# Patient Record
Sex: FEMALE | Race: ASIAN | NOT HISPANIC OR LATINO | URBAN - METROPOLITAN AREA
[De-identification: names, ages, dates, MRNs, and addresses within clinical notes are randomized per-mention and may not be internally consistent; named-entity substitution may affect disease eponyms.]

---

## 2017-01-27 ENCOUNTER — AMBULATORY - HEALTHEAST (OUTPATIENT)
Dept: PHYSICAL MEDICINE AND REHAB | Facility: CLINIC | Age: 42
End: 2017-01-27

## 2017-01-27 ENCOUNTER — RECORDS - HEALTHEAST (OUTPATIENT)
Dept: ADMINISTRATIVE | Facility: OTHER | Age: 42
End: 2017-01-27

## 2017-02-02 ENCOUNTER — HOSPITAL ENCOUNTER (OUTPATIENT)
Dept: PHYSICAL MEDICINE AND REHAB | Facility: CLINIC | Age: 42
Discharge: HOME | End: 2017-02-02
Attending: PHYSICIAN ASSISTANT

## 2017-02-02 DIAGNOSIS — G47.9 SLEEP DISTURBANCE: ICD-10-CM

## 2017-02-02 DIAGNOSIS — M54.16 LUMBAR RADICULITIS: ICD-10-CM

## 2017-02-02 DIAGNOSIS — M43.17 SPONDYLOLISTHESIS AT L5-S1 LEVEL: ICD-10-CM

## 2017-02-02 RX ORDER — ACETAMINOPHEN 500 MG
500 TABLET ORAL 2 TIMES DAILY
Status: SHIPPED | COMMUNITY
Start: 2017-02-02

## 2017-02-02 ASSESSMENT — MIFFLIN-ST. JEOR: SCORE: 1195.4

## 2017-02-03 ENCOUNTER — RECORDS - HEALTHEAST (OUTPATIENT)
Dept: ADMINISTRATIVE | Facility: OTHER | Age: 42
End: 2017-02-03

## 2017-03-02 ENCOUNTER — OFFICE VISIT - HEALTHEAST (OUTPATIENT)
Dept: PHYSICAL THERAPY | Facility: REHABILITATION | Age: 42
End: 2017-03-02

## 2017-03-02 DIAGNOSIS — M53.86 DECREASED ROM OF LUMBAR SPINE: ICD-10-CM

## 2017-03-02 DIAGNOSIS — M54.16 CHRONIC LEFT LUMBAR RADICULOPATHY: ICD-10-CM

## 2017-03-02 DIAGNOSIS — M62.81 LEFT-SIDED MUSCLE WEAKNESS: ICD-10-CM

## 2017-03-09 ENCOUNTER — OFFICE VISIT - HEALTHEAST (OUTPATIENT)
Dept: PHYSICAL THERAPY | Facility: REHABILITATION | Age: 42
End: 2017-03-09

## 2017-03-09 DIAGNOSIS — M53.86 DECREASED ROM OF LUMBAR SPINE: ICD-10-CM

## 2017-03-09 DIAGNOSIS — M62.81 LEFT-SIDED MUSCLE WEAKNESS: ICD-10-CM

## 2017-03-09 DIAGNOSIS — M54.16 CHRONIC LEFT LUMBAR RADICULOPATHY: ICD-10-CM

## 2017-04-03 ENCOUNTER — COMMUNICATION - HEALTHEAST (OUTPATIENT)
Dept: PHYSICAL MEDICINE AND REHAB | Facility: CLINIC | Age: 42
End: 2017-04-03

## 2017-04-03 DIAGNOSIS — M43.16 SPONDYLOLISTHESIS, LUMBAR REGION: ICD-10-CM

## 2017-04-03 DIAGNOSIS — M54.16 LUMBAR RADICULOPATHY: ICD-10-CM

## 2017-05-01 ENCOUNTER — COMMUNICATION - HEALTHEAST (OUTPATIENT)
Dept: PHYSICAL MEDICINE AND REHAB | Facility: CLINIC | Age: 42
End: 2017-05-01

## 2017-05-01 DIAGNOSIS — M54.16 LUMBAR RADICULOPATHY: ICD-10-CM

## 2017-05-01 DIAGNOSIS — M43.16 SPONDYLOLISTHESIS OF LUMBAR REGION: ICD-10-CM

## 2017-05-19 ENCOUNTER — HOSPITAL ENCOUNTER (OUTPATIENT)
Dept: PHYSICAL MEDICINE AND REHAB | Facility: CLINIC | Age: 42
Discharge: HOME | End: 2017-05-19
Attending: PHYSICIAN ASSISTANT

## 2017-05-19 DIAGNOSIS — M54.2 CERVICALGIA: ICD-10-CM

## 2017-05-19 DIAGNOSIS — M54.16 LUMBAR RADICULOPATHY: ICD-10-CM

## 2017-05-19 DIAGNOSIS — M79.18 MYOFASCIAL PAIN: ICD-10-CM

## 2017-05-19 DIAGNOSIS — M43.16 SPONDYLOLISTHESIS OF LUMBAR REGION: ICD-10-CM

## 2017-05-22 ENCOUNTER — COMMUNICATION - HEALTHEAST (OUTPATIENT)
Dept: PHYSICAL MEDICINE AND REHAB | Facility: CLINIC | Age: 42
End: 2017-05-22

## 2017-10-17 ENCOUNTER — AMBULATORY - HEALTHEAST (OUTPATIENT)
Dept: FAMILY MEDICINE | Facility: CLINIC | Age: 42
End: 2017-10-17

## 2017-10-17 RX ORDER — CETIRIZINE HYDROCHLORIDE 10 MG/1
TABLET ORAL
Refills: 0 | Status: SHIPPED | COMMUNITY
Start: 2017-10-02

## 2018-01-10 ENCOUNTER — OFFICE VISIT - HEALTHEAST (OUTPATIENT)
Dept: FAMILY MEDICINE | Facility: CLINIC | Age: 43
End: 2018-01-10

## 2018-01-10 DIAGNOSIS — N89.8 VAGINAL ITCHING: ICD-10-CM

## 2018-01-10 DIAGNOSIS — M48.061 FORAMINAL STENOSIS OF LUMBAR REGION: ICD-10-CM

## 2018-01-10 DIAGNOSIS — J30.2 CHRONIC SEASONAL ALLERGIC RHINITIS, UNSPECIFIED TRIGGER: ICD-10-CM

## 2018-01-10 DIAGNOSIS — M43.16 SPONDYLOLISTHESIS OF LUMBAR REGION: ICD-10-CM

## 2018-01-10 DIAGNOSIS — E55.9 VITAMIN D DEFICIENCY: ICD-10-CM

## 2018-01-10 RX ORDER — ERGOCALCIFEROL 1.25 MG/1
50000 CAPSULE ORAL WEEKLY
Qty: 12 CAPSULE | Refills: 4 | Status: SHIPPED | OUTPATIENT
Start: 2018-01-10

## 2021-05-30 VITALS — BODY MASS INDEX: 26.06 KG/M2 | WEIGHT: 138 LBS | HEIGHT: 61 IN

## 2021-05-31 VITALS — BODY MASS INDEX: 26.51 KG/M2 | WEIGHT: 138 LBS
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
## 2021-06-09 NOTE — PROGRESS NOTES
"[facility] Daily Progress     Patient Name: Jenny Garvey  Date: 3/9/2017  Visit #: 2  PTA visit #:  1  Referral Diagnosis:Referral Diagnosis: Spondylolisthesis at L5-S1 level  Referring provider: Carli Epps PA-C  Visit Diagnosis:     ICD-10-CM    1. Chronic left lumbar radiculopathy M54.16    2. Left-sided muscle weakness M62.81    3. Decreased ROM of lumbar spine M25.60          Assessment:   Tolerated additional exercises with some increase in achiness, but pain level measured the same.  Significant weakness noted in TrA and gluteals. Fair demo of HEP  Patient is appropriate to continue with skilled physical therapy intervention, as indicated by initial plan of care.    Goal Status:  Pt. will demonstrate/verbalize independence in self-management of condition in : 6 weeks  Pt. will be independent with home exercise program in : 4 weeks  Pt. will have improved quality of sleep: with less pain;waking less times/night;in 4 weeks  Pt will: no longer have left sided radicular symptoms within 8 weeks in order to improve QOL.  Pt will: have left sided LE strength 4/5 within 10 weeks in order to return to PLOF.    Plan / Patient Education:     Continue with initial plan of care.  Progress with home program as tolerated.    Subjective:     Pain Rating: Very achey today, pain down L LE to foot  Pain level 8/10    Objective:     STS 8 reps in 30\",safely, slow, no hands. Score indicates a high risk of falls    Treatment Today     TREATMENT MINUTES COMMENTS   Evaluation     Self-care/ Home management     Manual therapy     Neuromuscular Re-education 5 STS test, 8 reps in 30\"   Therapeutic Activity     Therapeutic Exercises 25 Reviewed nerve glides, added PPt abd set with march and clamshell. Explained role of exercise to pt pain   Gait training     Modality__________________                Total 30    Blank areas are intentional and mean the treatment did not include these items.       Paige VALENZUELA" KYUNG Montgomery  3/9/2017
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
## 2021-06-10 NOTE — PROGRESS NOTES
[facility] Discharge Summary  Patient Name: Jenny Garvey  Date: 4/18/2017  Referral Diagnosis: spondylolisthesis at L5-S1 level, lumbar radiculitis  Referring provider: Carli Epps PA-C  Visit Diagnosis:   1. Chronic left lumbar radiculopathy     2. Left-sided muscle weakness     3. Decreased ROM of lumbar spine         Goals:  Pt. will demonstrate/verbalize independence in self-management of condition in : 6 weeks;Not Met  Pt. will be independent with home exercise program in : 4 weeks;Not Met  Pt. will have improved quality of sleep: with less pain;waking less times/night;in 4 weeks;Not Met  Pt will: no longer have left sided radicular symptoms within 8 weeks in order to improve QOL. (GOAL NOT MET)  Pt will: have left sided LE strength 4/5 within 10 weeks in order to return to PLOF. (GOAL NOT MET)    Patient was seen for 2 visits  The patient attended therapy initially, but did not finish the therapy sessions prescribed.  Goals were not fully achieved. Explanation for goals not achieved: Patient did not reutrn to PT after 2nd visit  The patient was issued a HEP and instructed in proper usage.    Therapy will be discontinued at this time.  The patient will need a new referral to resume.    Thank you for your referral.  Haritha Navarro, PT, DPT  4/18/2017  10:16 AM

## 2021-06-10 NOTE — PROGRESS NOTES
Assessment:   Jenny Garvey is a 42 y.o. y.o. female with past medical history significant for Depression, anxiety, insomnia who presents today for follow-up regarding chronic low back pain with radiation into left greater than right lower extremity.  MRI of the lumbar spine shows L5-S1 spondylitic spondylolisthesis with left greater than right foraminal stenosis and L5 nerve root impingement.  The patient has not had any improvement with physical therapy and chiropractic manipulation.  -The patient also complains of a one-year history of bilateral axial neck pain which extends into the shoulders bilaterally.  She has not had any advanced imaging of her cervical spine.  She was told by her chiropractor that she has a straightening of the curvature of the neck.         Plan:     A shared decision making plan was used.  The patient's values and choices were respected.  The following represents what was discussed and decided upon by the physician assistant and the patient.  A professional  was present for the visit.    1.  DIAGNOSTIC TESTS: I reviewed the MRI of the lumbar spine.  No further diagnostic tests were ordered.  If the patient's neck pain fails to improve, I recommend obtaining an MRI of the cervical spine.    2.  PHYSICAL THERAPY: The patient is currently in physical therapy [facility].  Sounds like they are doing quite a bit of manual therapy for her.  They are also teaching her home exercise program.  I encouraged the patient to continue doing the home exercises on a daily basis.  She asked if she could get a referral just for massage.  I told her that I could not do that as massage is not covered by insurance, but she could certainly ask her physical therapist to continue with the manual treatments.    3.  MEDICATIONS: Cyclobenzaprine 5 mg 3 times daily as needed was prescribed.  The patient can continue using Tylenol as needed.  She did not tolerate gabapentin.    4.   INTERVENTIONS: I again offered the patient a bilateral L5-S1 transforaminal epidural steroid injection.  The patient indicated she would like to try physical therapy and chiropractic treatment for a period of time before moving forward with the injection.  The patient states that the injection will be the last resort for her.    5.  PATIENT EDUCATION: The patient asked me to fill out a form so that she can get assistance from the [facility].  I filled out that form indicating that she could work with restrictions including no lifting more than 20 pounds, must be able to alternate sit to stand every 20-30 minutes, and she should avoid repetitive bending and twisting.  The patient told me that she feels that she is unable to work at all due to her depression.  I told the patient that she could have the provider who is managing her depression fill out this form as well.  She was understanding to this.    6.  FOLLOW-UP: I will see the patient back in the clinic in 2 months to monitor her progress.  If she has any questions or concerns in the meantime, she should not hesitate contact our clinic.    Subjective:     Jenny Garvey is a 42 y.o. female who presents today for follow-up regarding low back pain with radiation into the left greater than right lower extremity.  I saw the patient in consultation on February 2, 2017.  At that time refer the patient to physical therapy.  The patient reports that she has not had any improvement in her symptoms of physical therapy.    The patient continues to complain of low back pain with radiation into left greater than right lower extremity.  The pain radiates into the buttocks, down the posterior lateral thighs, into the posterior lateral calves, and extends into the feet bilaterally.  The left side is much more severely affected than the right.  The patient states there is intermittent numbness and tingling in the same distribution as her pain which involves the entire foot and all 5  toes when it is not severe.  She feels that the left leg is generally weaker than the right.    The patient brings a second complaint today of chronic neck pain.  The patient states that for the past 1 year she has had bilateral posterior neck pain which extends into the shoulders bilaterally.  It does not radiate further down the arm.  Both sides are equally affected.  The patient states that she occasionally has headaches associated with the neck pain, especially if she has to sit or stand for a prolonged period of time.  The patient states that her chiropractor took an x-ray of her neck and told her that the curvature in her neck was straightened.  She describes it as a pulling pain which she rates as a 5 out of 10.  She states that her back pain is more severe than her leg pain.  She denies any numbness, tingling, or weakness down the arms.    Overall, the patient rates her pain today as an 8 out of 10.  At its best it is a 6 out of 10.  At its worst it is an 8 out of 10.  The patient's pain is aggravated with lifting, prolonged sitting and standing.  Her neck pain is aggravated with turning her head.    The patient is doing physical therapy at [facility].  She states that her physical therapist is doing some manual treatments for her.  She indicates that she would like to have more aggressive massage.  She is also going to a chiropractor which has been providing relief.  She is using Tylenol 2 tabs as needed.    Past medical history is reviewed and is unchanged in the interim.    Family history is reviewed and is unchanged in the interim.    Review of Systems:  Positive for numbness/tingling, footdrop, weakness, headache.  Negative for loss of bowel/bladder control, dizziness, nausea/vomiting, blurry vision, balance changes.     Objective:   CONSTITUTIONAL:  Vital signs as above.  No acute distress.  The patient is well nourished and well groomed.    PSYCHIATRIC:  The patient is awake,  alert, oriented to person, place and time.  The patient is answering questions appropriately with clear speech.  Normal affect.  HEENT: Normocephalic, atraumatic.  Sclera clear.    SKIN:  Skin over the face, posterior torso, bilateral upper and lower extremities is clean, dry, intact without rashes.  MUSCULOSKELETAL:  Gait is non-antalgic.  The patient is able to heel and toe walk without any difficulty.  Mild tenderness over the bilateral cervical paraspinous muscles and upper trapezius muscles.  The patient did have significant hypertonicity in the bilateral upper trapezius muscles.    The patient has 5/5 strength for the bilateral shoulder abductors, elbow flexors/extensors, wrist flexors/extensors, grasp.  The patient had 4/5 strength in the left EHL, otherwise 5/5 strength in the bilateral hip flexors, knee flexors/extensors, ankle dorsiflexors/plantar flexors, ankle evertors/invertors.    NEUROLOGICAL: 1+ biceps, triceps, brachioradialis, 2+ patellar, achilles reflexes which are symmetric bilaterally.  No ankle clonus bilaterally.  Sensation to light touch is intact in the bilateral L4, L5, and S1 dermatomes.       RESULTS:   MRI of the lumbar spine from [facility] dated January 24, 2017 was reviewed.  This shows grade 1 spondylitic spondylolisthesis at L5-S1 which results in left greater than right foraminal stenosis with impingement of the L5 ganglion bilaterally.  There is uncovering of the disc at this level with a small annular fissure.  At L4-5 there is a 1 mm central disc protrusion without spinal canal stenosis or foraminal stenosis.  Please see report for further details.

## 2021-06-15 NOTE — PROGRESS NOTES
[facility] Office Visit    Chief Complaint:  Chief Complaint   Patient presents with     Back Pain     lower back pain, requesting referral [facility]         Assessment/Plan:  1. Spondylolisthesis of lumbar region  - Ambulatory referral to PT/OT    2. Foraminal stenosis of lumbar region  - Ambulatory referral to PT/OT    3. Chronic seasonal allergic rhinitis, unspecified trigger  Refill meds as needed.      4. Vitamin D deficiency  Continue with weekly vitamin D  - ergocalciferol (ERGOCALCIFEROL) 50,000 unit capsule; Take 1 capsule (50,000 Units total) by mouth once a week.  Dispense: 12 capsule; Refill: 4    5. Vaginal itching  Prn problem- trial of yeast cream as needed.    - miconazole nitrate (MICONAZOLE) 200 mg Supp vaginal suppository; Apply vaginally Qhs x 3 nights  Dispense: 3 suppository; Refill: 6    Return if symptoms worsen or fail to improve.    Patient Education/AVS:  There are no Patient Instructions on file for this visit.    HPI:   Jenny Garvey is a 42 y.o. female c/o chronic low back pain.  [facility] to [facility].  New to [facility] and wanting to establish primary care. Pt notes that she hasn't really completed full course of PT in the past but as her pain is getting worse she is more committed to completing.  Did try gabapentin and flexeril but nothing helped and this caused sedation.      Had been going to [facility].  Wants to switch clinics b/c wanting more resources and pressure to get surgery.  Had a normal pap smear last year.      Pt notes that prior to her menstrual cycle she gets very itchy vaginally for 2-3 days and then once she starts bleeding the itchiness starts.  Not every month- every 2-3 months.  Not sexually active.  No symptoms this week.  Still getting regular periods.     ROS:  Constitutional, CV, Resp, GI, , MSK, skin, neuro, psych all negative except as outlined in the HPI above.    History summarized  from1-2:[facility] care- chronic lbp.  Spondylitic spondylolisthesis, foraminal stenosis, L5 nerve root impingement based on MRI.  Tried PT and chiropractor wihtout relief.  Recommended PT, Flexeril 5mg tid prn, offered epidural steroid injetcions but pt declined.  F/u in 2 months.    Old Records-1:MARCELL[facility] for [facility] signed and records requested.    Radiology tests reviewed-1: MRI 2/2017    Physical Exam:  BP 96/72 (Patient Site: Right Arm, Patient Position: Sitting, Cuff Size: Adult Regular)  Pulse 80  Resp 16  LMP 12/29/2017 (Approximate) There is no height or weight on file to calculate BMI. Patient's last menstrual period was 12/29/2017 (approximate).  Vital signs reviewed  Wt Readings from Last 3 Encounters:   05/19/17 138 lb (62.6 kg)   02/02/17 138 lb (62.6 kg)     History   Smoking Status     Never Smoker   Smokeless Tobacco     Never Used     Comment: no tobacco exposure     History   Sexual Activity     Sexual activity: Not Currently     No Data Recorded  No Data Recorded  No Data Recorded  No Data Recorded    All normal as below except abnormalities include: all normal.  Limited exam today- most of time was spent reviewing her history and updating chart as new patient.    General is a  42 y.o. female sitting comfortably in no apparent distress.   Extremities: Warm, No Edema, 2+ Pedal and radial pulses bilaterally  Skin: No lesions or rashes noted  Neuro/MSK: Able to ambulate around the exam room with equal movement, strength and normal coordination of the upper and lower extremeties symmetrically    Results for orders placed or performed in visit on 10/02/17   Vitamin D, Total (25-Hydroxy)   Result Value Ref Range    Vitamin D, Total (25-Hydroxy) 17.7 (L) 30.0 - 80.0 ng/mL       Med list and active problem list reviewed and updated as part of this encounter    Current Outpatient Prescriptions on File Prior to Visit   Medication Sig Dispense Refill     acetaminophen (TYLENOL) 500 MG tablet Take 500 mg by mouth 2  (two) times a day.       cetirizine (ZYRTEC) 10 MG tablet   0     EYE ITCH RELIEF 0.025 % (0.035 %) ophthalmic solution   2     No current facility-administered medications on file prior to visit.          Josselyn Joseph MD

## 2021-06-16 PROBLEM — J30.2 CHRONIC SEASONAL ALLERGIC RHINITIS: Status: ACTIVE | Noted: 2018-01-10

## 2021-06-16 PROBLEM — M43.16 SPONDYLOLISTHESIS OF LUMBAR REGION: Status: ACTIVE | Noted: 2018-01-10

## 2021-06-16 PROBLEM — M48.061 FORAMINAL STENOSIS OF LUMBAR REGION: Status: ACTIVE | Noted: 2018-01-10

## 2021-06-16 PROBLEM — E55.9 VITAMIN D DEFICIENCY: Status: ACTIVE | Noted: 2018-01-10
